# Patient Record
Sex: FEMALE | Race: WHITE | URBAN - METROPOLITAN AREA
[De-identification: names, ages, dates, MRNs, and addresses within clinical notes are randomized per-mention and may not be internally consistent; named-entity substitution may affect disease eponyms.]

---

## 2020-07-31 ENCOUNTER — OFFICE VISIT (OUTPATIENT)
Dept: URBAN - METROPOLITAN AREA CLINIC 71 | Facility: CLINIC | Age: 30
End: 2020-07-31
Payer: COMMERCIAL

## 2020-07-31 DIAGNOSIS — H15.111 EPISCLERITIS PERIODICA FUGAX, RIGHT EYE: Primary | ICD-10-CM

## 2020-07-31 PROCEDURE — 99203 OFFICE O/P NEW LOW 30 MIN: CPT | Performed by: OPHTHALMOLOGY

## 2020-07-31 RX ORDER — PREDNISOLONE ACETATE 10 MG/ML
1 % SUSPENSION/ DROPS OPHTHALMIC
Qty: 1 | Refills: 0 | Status: ACTIVE
Start: 2020-07-31

## 2020-07-31 ASSESSMENT — INTRAOCULAR PRESSURE
OD: 22
OS: 19

## 2020-07-31 NOTE — IMPRESSION/PLAN
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 Plan: Discussed diagnosis with patient. Begin Pred TID OD for 10 days.  Contact office if symptoms worsen